# Patient Record
Sex: FEMALE | Race: WHITE | Employment: FULL TIME | URBAN - METROPOLITAN AREA
[De-identification: names, ages, dates, MRNs, and addresses within clinical notes are randomized per-mention and may not be internally consistent; named-entity substitution may affect disease eponyms.]

---

## 2017-06-25 PROBLEM — K91.89 GASTRIC ANASTOMOTIC LEAK: Status: ACTIVE | Noted: 2017-06-25

## 2017-07-10 ENCOUNTER — HOSPITAL ENCOUNTER (OUTPATIENT)
Dept: SURGERY | Age: 37
Discharge: HOME | End: 2017-07-10
Attending: SURGERY | Admitting: SURGERY

## 2017-07-10 VITALS
HEIGHT: 62 IN | TEMPERATURE: 97.1 F | RESPIRATION RATE: 15 BRPM | DIASTOLIC BLOOD PRESSURE: 82 MMHG | BODY MASS INDEX: 41.96 KG/M2 | OXYGEN SATURATION: 91 % | WEIGHT: 228 LBS | HEART RATE: 74 BPM | SYSTOLIC BLOOD PRESSURE: 129 MMHG

## 2017-07-10 DIAGNOSIS — R52 PAIN: ICD-10-CM

## 2017-07-10 LAB — PREGNANCY, URINE: NEGATIVE

## 2017-07-10 RX ORDER — FENTANYL CITRATE 50 UG/ML
25 INJECTION, SOLUTION INTRAMUSCULAR; INTRAVENOUS EVERY 5 MIN PRN
Status: DISCONTINUED | OUTPATIENT
Start: 2017-07-10 | End: 2017-07-11 | Stop reason: HOSPADM

## 2017-07-10 RX ORDER — FENTANYL CITRATE 50 UG/ML
50 INJECTION, SOLUTION INTRAMUSCULAR; INTRAVENOUS ONCE
Status: COMPLETED | OUTPATIENT
Start: 2017-07-10 | End: 2017-07-10

## 2017-07-10 RX ORDER — ENALAPRILAT 2.5 MG/2ML
1.25 INJECTION INTRAVENOUS
Status: ACTIVE | OUTPATIENT
Start: 2017-07-10 | End: 2017-07-10

## 2017-07-10 RX ORDER — OXYCODONE HCL 5 MG/5 ML
10 SOLUTION, ORAL ORAL EVERY 4 HOURS PRN
Qty: 473 ML | Refills: 0 | Status: SHIPPED | OUTPATIENT
Start: 2017-07-10 | End: 2017-07-10

## 2017-07-10 RX ORDER — OXYCODONE HCL 5 MG/5 ML
10 SOLUTION, ORAL ORAL EVERY 4 HOURS PRN
Qty: 473 ML | Refills: 0 | Status: SHIPPED | OUTPATIENT
Start: 2017-07-10 | End: 2017-07-20

## 2017-07-10 RX ORDER — LABETALOL HYDROCHLORIDE 5 MG/ML
5 INJECTION, SOLUTION INTRAVENOUS EVERY 10 MIN PRN
Status: DISCONTINUED | OUTPATIENT
Start: 2017-07-10 | End: 2017-07-11 | Stop reason: HOSPADM

## 2017-07-10 RX ORDER — CIPROFLOXACIN 750 MG/1
TABLET, FILM COATED ORAL 2 TIMES DAILY
Refills: 0 | COMMUNITY
Start: 2017-07-05 | End: 2017-07-25

## 2017-07-10 RX ORDER — ONDANSETRON 2 MG/ML
4 INJECTION INTRAMUSCULAR; INTRAVENOUS
Status: ACTIVE | OUTPATIENT
Start: 2017-07-10 | End: 2017-07-10

## 2017-07-10 RX ORDER — MORPHINE SULFATE 2 MG/ML
2 INJECTION, SOLUTION INTRAMUSCULAR; INTRAVENOUS EVERY 5 MIN PRN
Status: DISCONTINUED | OUTPATIENT
Start: 2017-07-10 | End: 2017-07-11 | Stop reason: HOSPADM

## 2017-07-10 RX ORDER — HYDRALAZINE HYDROCHLORIDE 20 MG/ML
5 INJECTION INTRAMUSCULAR; INTRAVENOUS EVERY 5 MIN PRN
Status: DISCONTINUED | OUTPATIENT
Start: 2017-07-10 | End: 2017-07-11 | Stop reason: HOSPADM

## 2017-07-10 RX ADMIN — FENTANYL CITRATE 50 MCG: 50 INJECTION, SOLUTION INTRAMUSCULAR; INTRAVENOUS at 15:25

## 2017-07-10 ASSESSMENT — PAIN SCALES - GENERAL
PAINLEVEL_OUTOF10: 6
PAINLEVEL_OUTOF10: 0
PAINLEVEL_OUTOF10: 0

## 2017-07-19 ENCOUNTER — HOSPITAL ENCOUNTER (OUTPATIENT)
Dept: CT IMAGING | Age: 37
Discharge: HOME | End: 2017-07-19
Attending: SURGERY | Admitting: SURGERY

## 2017-07-19 DIAGNOSIS — R10.12 LEFT UPPER QUADRANT PAIN: ICD-10-CM

## 2017-07-19 DIAGNOSIS — R10.12 ABDOMINAL PAIN, LEFT UPPER QUADRANT: ICD-10-CM

## 2017-07-27 PROBLEM — K85.90 ACUTE PANCREATITIS: Status: ACTIVE | Noted: 2017-07-27

## 2017-08-01 ENCOUNTER — HOSPITAL ENCOUNTER (OUTPATIENT)
Dept: OTHER | Age: 37
Discharge: HOME | End: 2017-08-01
Attending: SURGERY | Admitting: SURGERY

## 2017-08-01 DIAGNOSIS — Z46.59 ENCOUNTER FOR NASOGASTRIC (NG) TUBE PLACEMENT: ICD-10-CM

## 2018-07-27 ENCOUNTER — HOSPITAL ENCOUNTER (EMERGENCY)
Age: 38
Discharge: HOME | End: 2018-07-28
Attending: EMERGENCY MEDICINE
Payer: COMMERCIAL

## 2018-07-27 DIAGNOSIS — R10.13 ABDOMINAL PAIN, EPIGASTRIC: Primary | ICD-10-CM

## 2018-07-27 LAB
ALBUMIN SERPL-MCNC: 4.8 G/DL (ref 3.4–5)
ALP BLD-CCNC: 63 U/L (ref 40–129)
ALT SERPL-CCNC: 11 U/L (ref 10–40)
ANION GAP SERPL CALCULATED.3IONS-SCNC: 12 MMOL/L (ref 3–16)
AST SERPL-CCNC: 18 U/L (ref 15–37)
BASOPHILS ABSOLUTE: 0.1 K/UL (ref 0–0.2)
BASOPHILS RELATIVE PERCENT: 0.8 %
BILIRUB SERPL-MCNC: 0.3 MG/DL (ref 0–1)
BILIRUBIN DIRECT: <0.2 MG/DL (ref 0–0.3)
BILIRUBIN URINE: NEGATIVE MG/DL
BILIRUBIN, INDIRECT: NORMAL MG/DL (ref 0–1)
BLOOD, URINE: ABNORMAL
BUN BLDV-MCNC: 19 MG/DL (ref 7–20)
CALCIUM SERPL-MCNC: 9.9 MG/DL (ref 8.3–10.6)
CHLORIDE BLD-SCNC: 99 MMOL/L (ref 99–110)
CLARITY: ABNORMAL
CO2: 26 MMOL/L (ref 21–32)
COLOR: ABNORMAL
CREAT SERPL-MCNC: 0.7 MG/DL (ref 0.6–1.1)
EOSINOPHILS ABSOLUTE: 0.1 K/UL (ref 0–0.6)
EOSINOPHILS RELATIVE PERCENT: 1.5 %
GFR AFRICAN AMERICAN: >60
GFR NON-AFRICAN AMERICAN: >60
GLUCOSE BLD-MCNC: 84 MG/DL (ref 70–99)
GLUCOSE URINE: NEGATIVE MG/DL
HCT VFR BLD CALC: 40.7 % (ref 36–48)
HEMOGLOBIN: 13.6 G/DL (ref 12–16)
KETONES, URINE: NEGATIVE MG/DL
LEUKOCYTE ESTERASE, URINE: NEGATIVE
LIPASE: 61 U/L (ref 13–60)
LYMPHOCYTES ABSOLUTE: 2.6 K/UL (ref 1–5.1)
LYMPHOCYTES RELATIVE PERCENT: 32.1 %
MCH RBC QN AUTO: 30.7 PG (ref 26–34)
MCHC RBC AUTO-ENTMCNC: 33.4 G/DL (ref 31–36)
MCV RBC AUTO: 91.8 FL (ref 80–100)
MICROSCOPIC EXAMINATION: YES
MONOCYTES ABSOLUTE: 0.7 K/UL (ref 0–1.3)
MONOCYTES RELATIVE PERCENT: 8.3 %
NEUTROPHILS ABSOLUTE: 4.6 K/UL (ref 1.7–7.7)
NEUTROPHILS RELATIVE PERCENT: 57.3 %
NITRITE, URINE: NEGATIVE
PDW BLD-RTO: 16.4 % (ref 12.4–15.4)
PH UA: 6.5
PLATELET # BLD: 257 K/UL (ref 135–450)
PMV BLD AUTO: 8 FL (ref 5–10.5)
POTASSIUM REFLEX MAGNESIUM: 3.9 MMOL/L (ref 3.5–5.1)
PREGNANCY, URINE: NEGATIVE
PROTEIN UA: NEGATIVE MG/DL
RBC # BLD: 4.43 M/UL (ref 4–5.2)
SODIUM BLD-SCNC: 137 MMOL/L (ref 136–145)
SPECIFIC GRAVITY UA: 1.02
TOTAL PROTEIN: 8.1 G/DL (ref 6.4–8.2)
UROBILINOGEN, URINE: 0.2 E.U./DL
WBC # BLD: 8 K/UL (ref 4–11)

## 2018-07-27 PROCEDURE — 96374 THER/PROPH/DIAG INJ IV PUSH: CPT

## 2018-07-27 PROCEDURE — 96375 TX/PRO/DX INJ NEW DRUG ADDON: CPT

## 2018-07-27 PROCEDURE — 99283 EMERGENCY DEPT VISIT LOW MDM: CPT

## 2018-07-27 PROCEDURE — 96361 HYDRATE IV INFUSION ADD-ON: CPT

## 2018-07-27 PROCEDURE — 85025 COMPLETE CBC W/AUTO DIFF WBC: CPT

## 2018-07-27 PROCEDURE — 6360000002 HC RX W HCPCS: Performed by: PHYSICIAN ASSISTANT

## 2018-07-27 PROCEDURE — 6370000000 HC RX 637 (ALT 250 FOR IP): Performed by: PHYSICIAN ASSISTANT

## 2018-07-27 PROCEDURE — 84703 CHORIONIC GONADOTROPIN ASSAY: CPT

## 2018-07-27 PROCEDURE — 80076 HEPATIC FUNCTION PANEL: CPT

## 2018-07-27 PROCEDURE — 83690 ASSAY OF LIPASE: CPT

## 2018-07-27 PROCEDURE — 80048 BASIC METABOLIC PNL TOTAL CA: CPT

## 2018-07-27 PROCEDURE — 2580000003 HC RX 258: Performed by: PHYSICIAN ASSISTANT

## 2018-07-27 PROCEDURE — 81001 URINALYSIS AUTO W/SCOPE: CPT

## 2018-07-27 RX ORDER — ONDANSETRON 2 MG/ML
INJECTION INTRAMUSCULAR; INTRAVENOUS
Status: DISCONTINUED
Start: 2018-07-27 | End: 2018-07-28 | Stop reason: WASHOUT

## 2018-07-27 RX ORDER — MORPHINE SULFATE 4 MG/ML
4 INJECTION, SOLUTION INTRAMUSCULAR; INTRAVENOUS ONCE
Status: COMPLETED | OUTPATIENT
Start: 2018-07-27 | End: 2018-07-27

## 2018-07-27 RX ORDER — HYDROCODONE BITARTRATE AND ACETAMINOPHEN 5; 325 MG/1; MG/1
1 TABLET ORAL ONCE
Status: COMPLETED | OUTPATIENT
Start: 2018-07-27 | End: 2018-07-27

## 2018-07-27 RX ORDER — 0.9 % SODIUM CHLORIDE 0.9 %
1000 INTRAVENOUS SOLUTION INTRAVENOUS ONCE
Status: COMPLETED | OUTPATIENT
Start: 2018-07-27 | End: 2018-07-27

## 2018-07-27 RX ORDER — ONDANSETRON 2 MG/ML
4 INJECTION INTRAMUSCULAR; INTRAVENOUS ONCE
Status: COMPLETED | OUTPATIENT
Start: 2018-07-27 | End: 2018-07-27

## 2018-07-27 RX ADMIN — MORPHINE SULFATE 4 MG: 4 INJECTION INTRAVENOUS at 21:40

## 2018-07-27 RX ADMIN — ONDANSETRON 4 MG: 2 INJECTION, SOLUTION INTRAMUSCULAR; INTRAVENOUS at 21:40

## 2018-07-27 RX ADMIN — HYDROCODONE BITARTRATE AND ACETAMINOPHEN 1 TABLET: 5; 325 TABLET ORAL at 23:31

## 2018-07-27 RX ADMIN — SODIUM CHLORIDE 1000 ML: 9 INJECTION, SOLUTION INTRAVENOUS at 21:40

## 2018-07-27 ASSESSMENT — ENCOUNTER SYMPTOMS
RESPIRATORY NEGATIVE: 1
CONSTIPATION: 0
DIARRHEA: 0
SHORTNESS OF BREATH: 0
NAUSEA: 1
VOMITING: 1
ABDOMINAL PAIN: 1

## 2018-07-27 ASSESSMENT — PAIN DESCRIPTION - LOCATION: LOCATION: ABDOMEN

## 2018-07-27 ASSESSMENT — PAIN DESCRIPTION - PAIN TYPE: TYPE: ACUTE PAIN

## 2018-07-27 ASSESSMENT — PAIN SCALES - GENERAL
PAINLEVEL_OUTOF10: 7
PAINLEVEL_OUTOF10: 7

## 2018-07-28 VITALS
BODY MASS INDEX: 32.2 KG/M2 | HEART RATE: 87 BPM | WEIGHT: 175 LBS | RESPIRATION RATE: 18 BRPM | DIASTOLIC BLOOD PRESSURE: 108 MMHG | SYSTOLIC BLOOD PRESSURE: 149 MMHG | HEIGHT: 62 IN | OXYGEN SATURATION: 99 % | TEMPERATURE: 98.5 F

## 2018-07-28 LAB
BACTERIA: ABNORMAL /HPF
EPITHELIAL CELLS, UA: ABNORMAL /HPF
RBC UA: ABNORMAL /HPF (ref 0–2)
WBC UA: ABNORMAL /HPF (ref 0–5)

## 2018-07-28 RX ORDER — OXYCODONE HYDROCHLORIDE 5 MG/1
5 TABLET ORAL EVERY 6 HOURS PRN
Qty: 12 TABLET | Refills: 0 | Status: SHIPPED | OUTPATIENT
Start: 2018-07-28 | End: 2018-07-31

## 2018-07-28 RX ORDER — ONDANSETRON 4 MG/1
4 TABLET, ORALLY DISINTEGRATING ORAL EVERY 6 HOURS PRN
Qty: 15 TABLET | Refills: 0 | Status: SHIPPED | OUTPATIENT
Start: 2018-07-28 | End: 2019-05-13 | Stop reason: ALTCHOICE

## 2018-07-28 RX ORDER — ACETAMINOPHEN 325 MG/1
650 TABLET ORAL EVERY 6 HOURS PRN
Qty: 30 TABLET | Refills: 0 | Status: SHIPPED | OUTPATIENT
Start: 2018-07-28 | End: 2019-08-22

## 2018-07-28 NOTE — ED NOTES
Patient discharged from [address] [place] [address] ED. LDA removed per protocol. Discharge instructions given with verbal understanding from the patient. Patient instructed to contact PCP or return if condition should change or worsen.       Rina Bolanos RN  07/28/18 4823

## 2018-07-28 NOTE — ED PROVIDER NOTES
4.00 - 5.20 M/uL    Hemoglobin 13.6 12.0 - 16.0 g/dL    Hematocrit 40.7 36.0 - 48.0 %    MCV 91.8 80.0 - 100.0 fL    MCH 30.7 26.0 - 34.0 pg    MCHC 33.4 31.0 - 36.0 g/dL    RDW 16.4 (H) 12.4 - 15.4 %    Platelets 475 526 - 071 K/uL    MPV 8.0 5.0 - 10.5 fL    Neutrophils % 57.3 %    Lymphocytes % 32.1 %    Monocytes % 8.3 %    Eosinophils % 1.5 %    Basophils % 0.8 %    Neutrophils # 4.6 1.7 - 7.7 K/uL    Lymphocytes # 2.6 1.0 - 5.1 K/uL    Monocytes # 0.7 0.0 - 1.3 K/uL    Eosinophils # 0.1 0.0 - 0.6 K/uL    Basophils # 0.1 0.0 - 0.2 K/uL   Basic Metabolic Panel w/ Reflex to MG   Result Value Ref Range    Sodium 137 136 - 145 mmol/L    Potassium reflex Magnesium 3.9 3.5 - 5.1 mmol/L    Chloride 99 99 - 110 mmol/L    CO2 26 21 - 32 mmol/L    Anion Gap 12 3 - 16    Glucose 84 70 - 99 mg/dL    BUN 19 7 - 20 mg/dL    CREATININE 0.7 0.6 - 1.1 mg/dL    GFR Non-African American >60 >60    GFR African American >60 >60    Calcium 9.9 8.3 - 10.6 mg/dL   Hepatic function panel   Result Value Ref Range    Total Protein 8.1 6.4 - 8.2 g/dL    Alb 4.8 3.4 - 5.0 g/dL    Alkaline Phosphatase 63 40 - 129 U/L    ALT 11 10 - 40 U/L    AST 18 15 - 37 U/L    Total Bilirubin 0.3 0.0 - 1.0 mg/dL    Bilirubin, Direct <0.2 0.0 - 0.3 mg/dL    Bilirubin, Indirect see below 0.0 - 1.0 mg/dL   Lipase   Result Value Ref Range    Lipase 61.0 (H) 13.0 - 60.0 U/L   POC URINE with Microscopic   Result Value Ref Range    Color, UA Not Entered Straw/Yellow    Clarity, UA Not Entered Clear    Glucose, Ur Negative Negative mg/dL    Bilirubin Urine Negative Negative mg/dL    Ketones, Urine Negative Negative mg/dL    Specific Gravity, UA 1.020 1.005 - 1.030    Blood, Urine TRACE-INTACT (A) Negative    pH, UA 6.5 5.0 - 8.0    Protein, UA Negative Negative mg/dL    Urobilinogen, Urine 0.2 <2.0 E.U./dL    Nitrite, Urine Negative Negative    Leukocyte Esterase, Urine Negative Negative    Microscopic Examination Yes    POC Pregnancy Urine Qual   Result Value

## 2018-12-08 ENCOUNTER — HOSPITAL ENCOUNTER (EMERGENCY)
Age: 38
Discharge: HOME | End: 2018-12-08
Attending: EMERGENCY MEDICINE
Payer: COMMERCIAL

## 2018-12-08 VITALS
BODY MASS INDEX: 32.57 KG/M2 | DIASTOLIC BLOOD PRESSURE: 83 MMHG | RESPIRATION RATE: 16 BRPM | HEIGHT: 62 IN | OXYGEN SATURATION: 98 % | WEIGHT: 177 LBS | TEMPERATURE: 98 F | HEART RATE: 92 BPM | SYSTOLIC BLOOD PRESSURE: 130 MMHG

## 2018-12-08 DIAGNOSIS — M54.12 CERVICAL RADICULOPATHY: Primary | ICD-10-CM

## 2018-12-08 PROCEDURE — 6370000000 HC RX 637 (ALT 250 FOR IP): Performed by: EMERGENCY MEDICINE

## 2018-12-08 PROCEDURE — 6360000002 HC RX W HCPCS: Performed by: EMERGENCY MEDICINE

## 2018-12-08 PROCEDURE — 96372 THER/PROPH/DIAG INJ SC/IM: CPT

## 2018-12-08 PROCEDURE — 99283 EMERGENCY DEPT VISIT LOW MDM: CPT

## 2018-12-08 RX ORDER — KETOROLAC TROMETHAMINE 30 MG/ML
30 INJECTION, SOLUTION INTRAMUSCULAR; INTRAVENOUS ONCE
Status: COMPLETED | OUTPATIENT
Start: 2018-12-08 | End: 2018-12-08

## 2018-12-08 RX ORDER — METHOCARBAMOL 500 MG/1
750 TABLET, FILM COATED ORAL ONCE
Status: COMPLETED | OUTPATIENT
Start: 2018-12-08 | End: 2018-12-08

## 2018-12-08 RX ORDER — ALPRAZOLAM 0.5 MG/1
0.5 TABLET ORAL NIGHTLY PRN
COMMUNITY

## 2018-12-08 RX ORDER — CYCLOBENZAPRINE HCL 10 MG
10 TABLET ORAL 3 TIMES DAILY PRN
COMMUNITY
End: 2019-08-22

## 2018-12-08 RX ORDER — TRAMADOL HYDROCHLORIDE 50 MG/1
100 TABLET ORAL EVERY 6 HOURS PRN
COMMUNITY
End: 2018-12-08 | Stop reason: SINTOL

## 2018-12-08 RX ORDER — METHOCARBAMOL 750 MG/1
750 TABLET, FILM COATED ORAL 4 TIMES DAILY
Qty: 40 TABLET | Refills: 0 | Status: SHIPPED | OUTPATIENT
Start: 2018-12-08 | End: 2018-12-18

## 2018-12-08 RX ORDER — GABAPENTIN 100 MG/1
100 CAPSULE ORAL 3 TIMES DAILY
Qty: 30 CAPSULE | Refills: 0 | Status: SHIPPED | OUTPATIENT
Start: 2018-12-08 | End: 2019-08-22

## 2018-12-08 RX ORDER — GABAPENTIN 100 MG/1
100 CAPSULE ORAL 3 TIMES DAILY
Qty: 30 CAPSULE | Refills: 0 | Status: SHIPPED | OUTPATIENT
Start: 2018-12-08 | End: 2018-12-08

## 2018-12-08 RX ADMIN — METHOCARBAMOL 750 MG: 500 TABLET ORAL at 21:38

## 2018-12-08 RX ADMIN — KETOROLAC TROMETHAMINE 30 MG: 30 INJECTION, SOLUTION INTRAMUSCULAR at 21:37

## 2018-12-08 ASSESSMENT — PAIN DESCRIPTION - FREQUENCY: FREQUENCY: CONTINUOUS

## 2018-12-08 ASSESSMENT — PAIN DESCRIPTION - LOCATION: LOCATION: ARM;NECK

## 2018-12-08 ASSESSMENT — PAIN SCALES - GENERAL
PAINLEVEL_OUTOF10: 9
PAINLEVEL_OUTOF10: 9

## 2018-12-08 ASSESSMENT — PAIN DESCRIPTION - ORIENTATION: ORIENTATION: RIGHT

## 2018-12-08 ASSESSMENT — PAIN DESCRIPTION - PAIN TYPE: TYPE: ACUTE PAIN

## 2018-12-08 ASSESSMENT — PAIN DESCRIPTION - DESCRIPTORS: DESCRIPTORS: RADIATING;SHARP

## 2018-12-09 NOTE — ED NOTES
Patient prepared for and ready to be discharged. Patient discharged at this time in no acute distress after verbalizing understanding of discharge instructions. Patient left after receiving After Visit Summary instructions.       Fadi Serna RN  12/08/18 1575

## 2018-12-09 NOTE — ED NOTES
PT presents to the ED from home with c/o neck pain which radiates down her right arm x1 week, the pain began prior to a trip to [place], [place] and was seen in the ed x2 while in [place] for the pain. Pt just returned home today and is concerned as the medications she has been prescribed (flexeril, tramadol, and prednisone) is not helping the pain.       Cassandra Mayberry RN  12/08/18 [id number]

## 2019-05-13 ENCOUNTER — HOSPITAL ENCOUNTER (EMERGENCY)
Age: 39
Discharge: HOME | End: 2019-05-13
Attending: EMERGENCY MEDICINE
Payer: COMMERCIAL

## 2019-05-13 ENCOUNTER — APPOINTMENT (OUTPATIENT)
Dept: CT IMAGING | Age: 39
End: 2019-05-13
Payer: COMMERCIAL

## 2019-05-13 VITALS
RESPIRATION RATE: 16 BRPM | SYSTOLIC BLOOD PRESSURE: 137 MMHG | WEIGHT: 175 LBS | HEIGHT: 62 IN | TEMPERATURE: 98.1 F | HEART RATE: 82 BPM | DIASTOLIC BLOOD PRESSURE: 86 MMHG | OXYGEN SATURATION: 96 % | BODY MASS INDEX: 32.2 KG/M2

## 2019-05-13 DIAGNOSIS — R11.0 NAUSEA: ICD-10-CM

## 2019-05-13 DIAGNOSIS — R10.13 ABDOMINAL PAIN, EPIGASTRIC: Primary | ICD-10-CM

## 2019-05-13 LAB
ALBUMIN SERPL-MCNC: 4.7 G/DL (ref 3.4–5)
ALP BLD-CCNC: 72 U/L (ref 40–129)
ALT SERPL-CCNC: 11 U/L (ref 10–40)
ANION GAP SERPL CALCULATED.3IONS-SCNC: 12 MMOL/L (ref 3–16)
AST SERPL-CCNC: 16 U/L (ref 15–37)
BASOPHILS ABSOLUTE: 0.1 K/UL (ref 0–0.2)
BASOPHILS RELATIVE PERCENT: 1 %
BILIRUB SERPL-MCNC: <0.2 MG/DL (ref 0–1)
BILIRUBIN DIRECT: <0.2 MG/DL (ref 0–0.3)
BILIRUBIN URINE: NEGATIVE
BILIRUBIN, INDIRECT: NORMAL MG/DL (ref 0–1)
BLOOD, URINE: NEGATIVE
BUN BLDV-MCNC: 16 MG/DL (ref 7–20)
CALCIUM SERPL-MCNC: 9.3 MG/DL (ref 8.3–10.6)
CHLORIDE BLD-SCNC: 101 MMOL/L (ref 99–110)
CLARITY: CLEAR
CO2: 24 MMOL/L (ref 21–32)
COLOR: YELLOW
CREAT SERPL-MCNC: 0.5 MG/DL (ref 0.6–1.1)
EOSINOPHILS ABSOLUTE: 0.1 K/UL (ref 0–0.6)
EOSINOPHILS RELATIVE PERCENT: 1.5 %
GFR AFRICAN AMERICAN: >60
GFR NON-AFRICAN AMERICAN: >60
GLUCOSE BLD-MCNC: 90 MG/DL (ref 70–99)
GLUCOSE URINE: NEGATIVE MG/DL
HCG(URINE) PREGNANCY TEST: NEGATIVE
HCT VFR BLD CALC: 41.2 % (ref 36–48)
HEMOGLOBIN: 13.3 G/DL (ref 12–16)
KETONES, URINE: NEGATIVE MG/DL
LEUKOCYTE ESTERASE, URINE: NEGATIVE
LIPASE: 51 U/L (ref 13–60)
LYMPHOCYTES ABSOLUTE: 2 K/UL (ref 1–5.1)
LYMPHOCYTES RELATIVE PERCENT: 28.5 %
MCH RBC QN AUTO: 29.8 PG (ref 26–34)
MCHC RBC AUTO-ENTMCNC: 32.4 G/DL (ref 31–36)
MCV RBC AUTO: 91.9 FL (ref 80–100)
MICROSCOPIC EXAMINATION: NORMAL
MONOCYTES ABSOLUTE: 0.6 K/UL (ref 0–1.3)
MONOCYTES RELATIVE PERCENT: 8.2 %
NEUTROPHILS ABSOLUTE: 4.4 K/UL (ref 1.7–7.7)
NEUTROPHILS RELATIVE PERCENT: 60.8 %
NITRITE, URINE: NEGATIVE
PDW BLD-RTO: 14 % (ref 12.4–15.4)
PH UA: 6 (ref 5–8)
PLATELET # BLD: 285 K/UL (ref 135–450)
PMV BLD AUTO: 8.1 FL (ref 5–10.5)
POTASSIUM SERPL-SCNC: 3.9 MMOL/L (ref 3.5–5.1)
PROTEIN UA: NEGATIVE MG/DL
RBC # BLD: 4.48 M/UL (ref 4–5.2)
SODIUM BLD-SCNC: 137 MMOL/L (ref 136–145)
SPECIFIC GRAVITY UA: 1.01 (ref 1–1.03)
TOTAL PROTEIN: 7.4 G/DL (ref 6.4–8.2)
URINE TYPE: NORMAL
UROBILINOGEN, URINE: 0.2 E.U./DL
WBC # BLD: 7.2 K/UL (ref 4–11)

## 2019-05-13 PROCEDURE — 6360000002 HC RX W HCPCS: Performed by: STUDENT IN AN ORGANIZED HEALTH CARE EDUCATION/TRAINING PROGRAM

## 2019-05-13 PROCEDURE — 85025 COMPLETE CBC W/AUTO DIFF WBC: CPT

## 2019-05-13 PROCEDURE — 99284 EMERGENCY DEPT VISIT MOD MDM: CPT

## 2019-05-13 PROCEDURE — 84703 CHORIONIC GONADOTROPIN ASSAY: CPT

## 2019-05-13 PROCEDURE — 74177 CT ABD & PELVIS W/CONTRAST: CPT

## 2019-05-13 PROCEDURE — 81003 URINALYSIS AUTO W/O SCOPE: CPT

## 2019-05-13 PROCEDURE — 80048 BASIC METABOLIC PNL TOTAL CA: CPT

## 2019-05-13 PROCEDURE — 80076 HEPATIC FUNCTION PANEL: CPT

## 2019-05-13 PROCEDURE — 83690 ASSAY OF LIPASE: CPT

## 2019-05-13 PROCEDURE — 6360000004 HC RX CONTRAST MEDICATION: Performed by: EMERGENCY MEDICINE

## 2019-05-13 PROCEDURE — 96375 TX/PRO/DX INJ NEW DRUG ADDON: CPT

## 2019-05-13 PROCEDURE — 96374 THER/PROPH/DIAG INJ IV PUSH: CPT

## 2019-05-13 RX ORDER — ONDANSETRON 2 MG/ML
4 INJECTION INTRAMUSCULAR; INTRAVENOUS ONCE
Status: COMPLETED | OUTPATIENT
Start: 2019-05-13 | End: 2019-05-13

## 2019-05-13 RX ORDER — ONDANSETRON 4 MG/1
4 TABLET, ORALLY DISINTEGRATING ORAL EVERY 8 HOURS PRN
Qty: 10 TABLET | Refills: 0 | Status: SHIPPED | OUTPATIENT
Start: 2019-05-13 | End: 2019-05-16

## 2019-05-13 RX ORDER — FENTANYL CITRATE 50 UG/ML
25 INJECTION, SOLUTION INTRAMUSCULAR; INTRAVENOUS ONCE
Status: COMPLETED | OUTPATIENT
Start: 2019-05-13 | End: 2019-05-13

## 2019-05-13 RX ADMIN — IOPAMIDOL 80 ML: 755 INJECTION, SOLUTION INTRAVENOUS at 18:22

## 2019-05-13 RX ADMIN — ONDANSETRON 4 MG: 2 INJECTION INTRAMUSCULAR; INTRAVENOUS at 17:20

## 2019-05-13 RX ADMIN — FENTANYL CITRATE 25 MCG: 50 INJECTION INTRAMUSCULAR; INTRAVENOUS at 18:26

## 2019-05-13 ASSESSMENT — ENCOUNTER SYMPTOMS
SORE THROAT: 0
WHEEZING: 0
COUGH: 0
NAUSEA: 1
BACK PAIN: 0
RHINORRHEA: 0
BLOOD IN STOOL: 0
CONSTIPATION: 0
ABDOMINAL PAIN: 1
VOMITING: 0
DIARRHEA: 0
SHORTNESS OF BREATH: 0

## 2019-05-13 ASSESSMENT — PAIN DESCRIPTION - PAIN TYPE: TYPE: ACUTE PAIN

## 2019-05-13 ASSESSMENT — PAIN DESCRIPTION - FREQUENCY: FREQUENCY: CONTINUOUS

## 2019-05-13 ASSESSMENT — PAIN DESCRIPTION - LOCATION
LOCATION: ABDOMEN
LOCATION: ABDOMEN

## 2019-05-13 ASSESSMENT — PAIN SCALES - GENERAL
PAINLEVEL_OUTOF10: 7
PAINLEVEL_OUTOF10: 3

## 2019-05-13 ASSESSMENT — PAIN DESCRIPTION - DESCRIPTORS: DESCRIPTORS: SQUEEZING

## 2019-05-13 ASSESSMENT — PAIN DESCRIPTION - ORIENTATION: ORIENTATION: MID

## 2019-05-13 NOTE — ED TRIAGE NOTES
Pt states she is having symptoms that feeling like when she had pancreatitis. Symptoms started 4 hours ago.

## 2019-05-13 NOTE — ED PROVIDER NOTES
[id number] [facility] RESIDENT NOTE       Date of evaluation: 5/13/2019    Chief Complaint     Abdominal Pain (Started 4 hours ago.) and Nausea    History of Present Illness     Deisy Morgan is a 41 y.o. female who presents with epigastric abdominal pain for less than 1 day in duration. She has a history of pancreatitis after a gastric sleeve procedure and feels that this similar in quality and severity. She endorses sharp, waxing and waning moderate-severe epigastric pain without radiation that started yesterday evening. The pain has not been gradually worsening. She has tried OTC pain medication at home without relief and endorses nausea without vomiting. Her last episode of abdominal pain that was similar to this lasted about 24 hours and self-resolved, her lipase was very mildly elevated at that time. She drinks 1 bottle of beer/ 1 glass of wine 4/7 days of the week, but had 1/2 bottle of wine last night while watching [employer]. She is having regular periods, and has had several miscarriages with her last miscarriage in February that was not similar in presentation to her current state. She endorses not being pregnant at this time. Please see further ROS below. Review of Systems     Review of Systems   Constitutional: Negative for chills and fever. HENT: Negative for rhinorrhea and sore throat. Eyes: Negative for visual disturbance. Respiratory: Negative for cough, shortness of breath and wheezing. Cardiovascular: Negative for chest pain, palpitations and leg swelling. Gastrointestinal: Positive for abdominal pain and nausea. Negative for blood in stool, constipation, diarrhea and vomiting. Genitourinary: Negative for difficulty urinating, dysuria, frequency, hematuria, vaginal bleeding, vaginal discharge and vaginal pain. Musculoskeletal: Negative for back pain and myalgias. Skin: Negative for wound.    Neurological: Negative for dizziness, weakness, light-headedness, numbness and headaches. Psychiatric/Behavioral: Negative for confusion. Past Medical, Surgical, Family, and Social History     She has a past medical history of Depression, Pancreatitis, and Sleep apnea. She has a past surgical history that includes Cholecystectomy; Abdomen surgery; other surgical history (06/27/2017); other surgical history (N/A, 06/29/2017); and other surgical history (07/10/2017). Her family history is not on file. She reports that she has quit smoking. Her smoking use included cigarettes. She has never used smokeless tobacco. She reports that she drinks alcohol. She reports that she does not use drugs. Medications     Discharge Medication List as of 5/13/2019  7:25 PM      CONTINUE these medications which have NOT CHANGED    Details   RaNITidine HCl (ZANTAC PO) Take by mouthHistorical Med      sertraline (ZOLOFT) 100 MG tablet Take 150 mg by mouth dailyHistorical Med      PREDNISONE PO Take by mouthHistorical Med      cyclobenzaprine (FLEXERIL) 10 MG tablet Take 10 mg by mouth 3 times daily as needed for Muscle spasmsHistorical Med      ALPRAZolam (XANAX) 0.5 MG tablet Take 0.5 mg by mouth nightly as needed for Sleep. Anamaria Corona Historical Med      gabapentin (NEURONTIN) 100 MG capsule Take 1 capsule by mouth 3 times daily for 10 days. Intended supply: 10 days. , Disp-30 capsule, R-0Print      acetaminophen (TYLENOL) 325 MG tablet Take 2 tablets by mouth every 6 hours as needed for Pain, Disp-30 tablet, R-0Print      naproxen (NAPROSYN) 500 MG tablet Take 1 tablet by mouth 2 times daily With food, Disp-20 tablet, R-0Print      oxyCODONE-acetaminophen (PERCOCET) 5-325 MG per tablet Take 1 tablet by mouth every 6 hours as needed for Pain ., Disp-30 tablet, R-0Print      promethazine (PHENERGAN) 25 MG tablet Take 1 tablet by mouth every 6 hours as needed for Nausea WARNING:  May cause drowsiness. May impair ability to operate vehicles or machinery.   Do not use in combination with alcohol., Disp-30 tablet, R-0Print      prochlorperazine (COMPAZINE) 5 MG tablet Take 1 tablet by mouth every 6 hours as needed for Nausea, Disp-30 tablet, R-0Print      enoxaparin (LOVENOX) 30 MG/0.3ML injection Inject 0.3 mLs into the skin 2 times daily, Disp-30 Syringe, R-0Print             Allergies     She has No Known Allergies. Physical Exam     INITIAL VITALS: BP: (!) 121/101, Temp: 98.1 °F (36.7 °C), Pulse: 75, Resp: 16, SpO2: 99 %   Physical Exam   Constitutional: She is oriented to person, place, and time. She appears well-developed and well-nourished. HENT:   Head: Normocephalic and atraumatic. Eyes: Conjunctivae are normal.   Cardiovascular: Normal rate, regular rhythm, normal heart sounds and intact distal pulses. Exam reveals no gallop and no friction rub. No murmur heard. Pulmonary/Chest: Effort normal and breath sounds normal. No stridor. No respiratory distress. She has no wheezes. She exhibits no tenderness. Abdominal: Soft. Bowel sounds are normal. She exhibits no distension. There is no hepatosplenomegaly. There is tenderness (epigastric). There is no guarding. Musculoskeletal: Normal range of motion. She exhibits no edema. Neurological: She is alert and oriented to person, place, and time. Skin: Skin is warm and dry. Capillary refill takes less than 2 seconds. Diagnostic Results     RADIOLOGY:  CT ABDOMEN PELVIS W IV CONTRAST Additional Contrast? None   Final Result      1. No acute abnormality in the abdomen and pelvis. 2. Uterine fibroids.            LABS:   Results for orders placed or performed during the hospital encounter of 05/13/19   CBC auto differential   Result Value Ref Range    WBC 7.2 4.0 - 11.0 K/uL    RBC 4.48 4.00 - 5.20 M/uL    Hemoglobin 13.3 12.0 - 16.0 g/dL    Hematocrit 41.2 36.0 - 48.0 %    MCV 91.9 80.0 - 100.0 fL    MCH 29.8 26.0 - 34.0 pg    MCHC 32.4 31.0 - 36.0 g/dL    RDW 14.0 12.4 - 15.4 %    Platelets 064 242 - 726 K/uL    MPV 8.1 5.0 - 10.5 fL    Neutrophils % 60.8 %    Lymphocytes % 28.5 %    Monocytes % 8.2 %    Eosinophils % 1.5 %    Basophils % 1.0 %    Neutrophils # 4.4 1.7 - 7.7 K/uL    Lymphocytes # 2.0 1.0 - 5.1 K/uL    Monocytes # 0.6 0.0 - 1.3 K/uL    Eosinophils # 0.1 0.0 - 0.6 K/uL    Basophils # 0.1 0.0 - 0.2 K/uL   Basic Metabolic Panel   Result Value Ref Range    Sodium 137 136 - 145 mmol/L    Potassium 3.9 3.5 - 5.1 mmol/L    Chloride 101 99 - 110 mmol/L    CO2 24 21 - 32 mmol/L    Anion Gap 12 3 - 16    Glucose 90 70 - 99 mg/dL    BUN 16 7 - 20 mg/dL    CREATININE 0.5 (L) 0.6 - 1.1 mg/dL    GFR Non-African American >60 >60    GFR African American >60 >60    Calcium 9.3 8.3 - 10.6 mg/dL   Hepatic function panel (LFTs)   Result Value Ref Range    Total Protein 7.4 6.4 - 8.2 g/dL    Alb 4.7 3.4 - 5.0 g/dL    Alkaline Phosphatase 72 40 - 129 U/L    ALT 11 10 - 40 U/L    AST 16 15 - 37 U/L    Total Bilirubin <0.2 0.0 - 1.0 mg/dL    Bilirubin, Direct <0.2 0.0 - 0.3 mg/dL    Bilirubin, Indirect see below 0.0 - 1.0 mg/dL   Lipase   Result Value Ref Range    Lipase 51.0 13.0 - 60.0 U/L   Urinalysis, reflex to microscopic (Lab)   Result Value Ref Range    Color, UA Yellow Straw/Yellow    Clarity, UA Clear Clear    Glucose, Ur Negative Negative mg/dL    Bilirubin Urine Negative Negative    Ketones, Urine Negative Negative mg/dL    Specific Gravity, UA 1.010 1.005 - 1.030    Blood, Urine Negative Negative    pH, UA 6.0 5.0 - 8.0    Protein, UA Negative Negative mg/dL    Urobilinogen, Urine 0.2 <2.0 E.U./dL    Nitrite, Urine Negative Negative    Leukocyte Esterase, Urine Negative Negative    Microscopic Examination Not Indicated     Urine Type Voided    hCG, qualitative, pregnancy (Lab)   Result Value Ref Range    HCG(Urine) Pregnancy Test Negative Detects HCG level >20 MIU/mL        ED BEDSIDE ULTRASOUND:  None    RECENT VITALS:  BP: 137/86, Temp: 98.1 °F (36.7 °C),Pulse: 82, Resp: 16, SpO2: 96 %     Procedures None    ED Course     Nursing Notes, Past Medical Hx, Past Surgical Hx, Social Hx, Allergies, and Family Hx were reviewed. The patient was given the following medications:  Orders Placed This Encounter   Medications    ondansetron (ZOFRAN) injection 4 mg    fentaNYL (SUBLIMAZE) injection 25 mcg    iopamidol (ISOVUE-370) 76 % injection 80 mL    ondansetron (ZOFRAN ODT) 4 MG disintegrating tablet     Sig: Take 1 tablet by mouth every 8 hours as needed for Nausea or Vomiting     Dispense:  10 tablet     Refill:  0       CONSULTS:  None    MEDICAL DECISION MAKING / ASSESSMENT / Julius Mendoza is a 41 y.o. 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 Based on the patient's presentation and physical exam, there was a concern for pancreatitis, or a complication from her previous gastric sleeve such as gastric leak. Her work-up including lipase, LFT's, BMP, microscopic UA, and urine pregnancy test were negative. Given her history, a CT of the abdomen and pelvis was ordered that showed no acute abnormalities, but did demonstrate uterine fibroids. The patient was given pain control, anti-emetics with reduction in her symptoms and counseled about concerning signs or symptoms that may warrant emergency medical assistance. Her findings and plan of care was discussed with her, including instructions to follow up with her PCP and gastroenterologist, and she voiced understanding and was agreeable to her plan of care. This patient was also evaluated by the attending physician. All care plans were discussed and agreed upon. Clinical Impression     1. Abdominal pain, epigastric    2.  Nausea        Disposition     PATIENT REFERRED TO:  Modesto Locke MD  [address] [id number]  [place] [id number]  [phone]    In 3 days  If symptoms worsen    Gastroenterology  Please follow up with your GI doctor as needed.           DISCHARGE MEDICATIONS:  Discharge Medication List as of 5/13/2019  7:25 PM      START taking these medications    Details   ondansetron (ZOFRAN ODT) 4 MG disintegrating tablet Take 1 tablet by mouth every 8 hours as needed for Nausea or Vomiting, Disp-10 tablet, R-0Print             DISPOSITION Decision To Discharge 05/13/2019 06:59:20 PM       Cris Cavazos MD  Resident  05/13/19 2097

## 2019-05-13 NOTE — ED NOTES
Pt given dc instructions, verbalized understanding. Pt educated on side effects of new prescriptions and how to take, verbalized understanding. Pt has no further questions or concerns at this time. Pt in no signs of distress. Pt has steady gait to lobby.       Perez Borjas RN  05/13/19 [id number]

## 2019-08-22 ENCOUNTER — APPOINTMENT (OUTPATIENT)
Dept: GENERAL RADIOLOGY | Age: 39
End: 2019-08-22
Payer: COMMERCIAL

## 2019-08-22 ENCOUNTER — HOSPITAL ENCOUNTER (EMERGENCY)
Age: 39
Discharge: HOME | End: 2019-08-22
Attending: EMERGENCY MEDICINE
Payer: COMMERCIAL

## 2019-08-22 VITALS
WEIGHT: 176 LBS | HEART RATE: 84 BPM | TEMPERATURE: 98.6 F | RESPIRATION RATE: 12 BRPM | OXYGEN SATURATION: 97 % | SYSTOLIC BLOOD PRESSURE: 156 MMHG | BODY MASS INDEX: 31.18 KG/M2 | DIASTOLIC BLOOD PRESSURE: 96 MMHG | HEIGHT: 63 IN

## 2019-08-22 DIAGNOSIS — S16.1XXA ACUTE STRAIN OF NECK MUSCLE, INITIAL ENCOUNTER: ICD-10-CM

## 2019-08-22 DIAGNOSIS — M53.82 MUSCULOSKELETAL DISORDER INVOLVING SUBOCCIPITAL REGION: Primary | ICD-10-CM

## 2019-08-22 PROCEDURE — 72040 X-RAY EXAM NECK SPINE 2-3 VW: CPT

## 2019-08-22 PROCEDURE — 6370000000 HC RX 637 (ALT 250 FOR IP): Performed by: EMERGENCY MEDICINE

## 2019-08-22 PROCEDURE — 99283 EMERGENCY DEPT VISIT LOW MDM: CPT

## 2019-08-22 RX ORDER — CYCLOBENZAPRINE HCL 10 MG
10 TABLET ORAL ONCE
Status: COMPLETED | OUTPATIENT
Start: 2019-08-22 | End: 2019-08-22

## 2019-08-22 RX ORDER — BUPROPION HYDROCHLORIDE 100 MG/1
100 TABLET, EXTENDED RELEASE ORAL DAILY
COMMUNITY

## 2019-08-22 RX ORDER — LIDOCAINE 4 G/G
1 PATCH TOPICAL DAILY
Status: DISCONTINUED | OUTPATIENT
Start: 2019-08-22 | End: 2019-08-22 | Stop reason: HOSPADM

## 2019-08-22 RX ORDER — DICLOFENAC SODIUM 75 MG/1
75 TABLET, DELAYED RELEASE ORAL 2 TIMES DAILY
Qty: 10 TABLET | Refills: 0 | Status: SHIPPED | OUTPATIENT
Start: 2019-08-22

## 2019-08-22 RX ORDER — LIDOCAINE 50 MG/G
1 PATCH TOPICAL DAILY
Qty: 10 PATCH | Refills: 0 | Status: SHIPPED | OUTPATIENT
Start: 2019-08-22 | End: 2019-09-01

## 2019-08-22 RX ORDER — CYCLOBENZAPRINE HCL 5 MG
5 TABLET ORAL 2 TIMES DAILY PRN
Qty: 10 TABLET | Refills: 0 | Status: SHIPPED | OUTPATIENT
Start: 2019-08-22 | End: 2019-09-01

## 2019-08-22 RX ADMIN — CYCLOBENZAPRINE HYDROCHLORIDE 10 MG: 10 TABLET, FILM COATED ORAL at 05:32

## 2019-08-22 ASSESSMENT — PAIN DESCRIPTION - FREQUENCY: FREQUENCY: CONTINUOUS

## 2019-08-22 ASSESSMENT — PAIN DESCRIPTION - DESCRIPTORS: DESCRIPTORS: SHARP

## 2019-08-22 ASSESSMENT — PAIN DESCRIPTION - LOCATION: LOCATION: NECK

## 2019-08-22 ASSESSMENT — PAIN DESCRIPTION - PAIN TYPE: TYPE: ACUTE PAIN

## 2019-08-22 ASSESSMENT — PAIN SCALES - GENERAL
PAINLEVEL_OUTOF10: 6
PAINLEVEL_OUTOF10: 7

## 2019-08-22 NOTE — ED NOTES
Pt dc/d with instructions and rx in stable condition, ambulatory to [facility]. Home per karon.       Penelope Tong RN  08/22/19 [id number]